# Patient Record
Sex: FEMALE | Race: WHITE | NOT HISPANIC OR LATINO | Employment: FULL TIME | URBAN - METROPOLITAN AREA
[De-identification: names, ages, dates, MRNs, and addresses within clinical notes are randomized per-mention and may not be internally consistent; named-entity substitution may affect disease eponyms.]

---

## 2023-12-14 ENCOUNTER — TELEPHONE (OUTPATIENT)
Dept: OBSTETRICS AND GYNECOLOGY | Facility: CLINIC | Age: 38
End: 2023-12-14
Payer: COMMERCIAL

## 2023-12-15 ENCOUNTER — OFFICE VISIT (OUTPATIENT)
Dept: OBSTETRICS AND GYNECOLOGY | Facility: CLINIC | Age: 38
End: 2023-12-15
Payer: COMMERCIAL

## 2023-12-15 VITALS
WEIGHT: 162 LBS | BODY MASS INDEX: 25.43 KG/M2 | HEIGHT: 67 IN | SYSTOLIC BLOOD PRESSURE: 100 MMHG | DIASTOLIC BLOOD PRESSURE: 60 MMHG

## 2023-12-15 DIAGNOSIS — N92.6 MISSED PERIOD: ICD-10-CM

## 2023-12-15 DIAGNOSIS — Z01.419 ROUTINE GYNECOLOGICAL EXAMINATION: Primary | ICD-10-CM

## 2023-12-15 DIAGNOSIS — Z91.89 AT RISK FOR FERTILITY PROBLEMS: ICD-10-CM

## 2023-12-15 LAB
B-HCG UR QL: NEGATIVE
EXPIRATION DATE: NORMAL
INTERNAL NEGATIVE CONTROL: NORMAL
INTERNAL POSITIVE CONTROL: NORMAL
Lab: NORMAL

## 2023-12-15 NOTE — PROGRESS NOTES
Gynecologic Annual Exam Note          GYN Annual Exam     Gynecologic Exam        Subjective     HPI  Juan J AMBROSIO is a 38 y.o. female, , who presents for annual well woman exam as a new patient . Patient's last menstrual period was 2023 (exact date)..  Patient reports her period is 8 days late. She has taken several HPT and all have been negative . Patient states she and her  are trying to conceive. She and her  have been to [facility] with Dr. Ghosh 3 years ago. They did labs and an ultrasound, as well as semen analysis for , and no issues were found. Her periods occur every 24-26 days, lasting 5 days. The flow is moderate.. She reports dysmenorrhea is severe, occurring first 1-2 days of flow. Marital Status: . She is sexually active. She has not had new partners.. STD testing recommendations have been explained to the patient and she does not desire STD testing. There were no changes to her medical or surgical history since her last visit..       She has never had a gynecologic exam outside of the fertility testing she had done (TVUS and HSG). She got  5 years ago, and has never had any other partners, so she didn't think it was needed.   Over the last several months they have not been trying as much, states they have sex every 3-4 days. Before this they were having sex every other day during the fertile period, she was using OPK's and getting positives.       Additional OB/GYN History   Current contraception: contraceptive methods: None  Desires to: do not start contraception    Last Pap : Never. Result: N/A. HPV:  N/A .   Last Completed Pap Smear       This patient has no relevant Health Maintenance data.          History of abnormal Pap smear: no  Family history of uterine, colon, breast, or ovarian cancer: no  Performs monthly Self-Breast Exam: yes  Last mammogram: Never.      Last Completed Mammogram       This patient has no relevant Health  "Maintenance data.            History of abnormal mammogram: no    Colonoscopy: has never had a colonoscopy.  Exercises Regularly: no  Feelings of Anxiety or Depression: no  Tobacco Usage?: No     No current outpatient medications on file.     Patient denies the need for medication refills today.    OB History          0    Para   0    Term   0       0    AB   0    Living   0         SAB   0    IAB   0    Ectopic   0    Molar   0    Multiple   0    Live Births   0                Past Medical History:   Diagnosis Date    Female infertility     Migraine     Varicella         Past Surgical History:   Procedure Laterality Date    APPENDECTOMY         Health Maintenance   Topic Date Due    Annual Gynecologic Pelvic and Breast Exam  Never done    BMI FOLLOWUP  Never done    TDAP/TD VACCINES (1 - Tdap) Never done    INFLUENZA VACCINE  2023    COVID-19 Vaccine (2 - -24 season) 2023    HEPATITIS C SCREENING  Never done    ANNUAL PHYSICAL  Never done    PAP SMEAR  Never done    Pneumococcal Vaccine 0-64  Aged Out       The additional following portions of the patient's history were reviewed and updated as appropriate: allergies, current medications, past family history, past medical history, past social history, past surgical history, and problem list.    Review of Systems   Constitutional: Negative.    Respiratory: Negative.     Cardiovascular: Negative.    Gastrointestinal: Negative.    Genitourinary:  Positive for menstrual problem.   Psychiatric/Behavioral: Negative.           I have reviewed and agree with the HPI, ROS, and historical information as entered above. Velvet Esteban, ANTWAN          Objective   /60   Ht 170.2 cm (67\")   Wt 73.5 kg (162 lb)   LMP 2023 (Exact Date)   BMI 25.37 kg/m²     Physical Exam  Vitals and nursing note reviewed. Exam conducted with a chaperone present.   Constitutional:       General: She is not in acute distress.     Appearance: Normal appearance. " She is well-developed and normal weight. She is not ill-appearing.   Neck:      Thyroid: No thyroid mass or thyromegaly.   Pulmonary:      Effort: Pulmonary effort is normal. No respiratory distress or retractions.   Chest:      Chest wall: No mass.   Breasts:     Right: Normal. No mass, nipple discharge, skin change or tenderness.      Left: Normal. No mass, nipple discharge, skin change or tenderness.      Comments: Fibrocystic tissue present bilaterally    Abdominal:      General: There is no distension.      Palpations: Abdomen is soft. Abdomen is not rigid. There is no mass.      Tenderness: There is no abdominal tenderness. There is no guarding or rebound.      Hernia: No hernia is present.   Genitourinary:     General: Normal vulva.      Exam position: Lithotomy position.      Labia:         Right: No rash, tenderness or lesion.         Left: No rash, tenderness or lesion.       Vagina: Normal. No vaginal discharge or lesions.      Cervix: Normal. No cervical motion tenderness, discharge, friability, erythema or cervical bleeding.      Uterus: Normal. Not enlarged, not fixed and not tender.       Adnexa: Right adnexa normal and left adnexa normal.        Right: No mass or tenderness.          Left: No mass or tenderness.        Rectum: Normal. No external hemorrhoid.   Musculoskeletal:      Cervical back: No muscular tenderness.   Skin:     General: Skin is warm and dry.   Neurological:      Mental Status: She is alert and oriented to person, place, and time.   Psychiatric:         Mood and Affect: Mood normal.         Behavior: Behavior normal.            Assessment and Plan    Problem List Items Addressed This Visit    None  Visit Diagnoses       Routine gynecological examination    -  Primary    Relevant Orders    LIQUID-BASED PAP SMEAR WITH HPV GENOTYPING REGARDLESS OF INTERPRETATION ([facility])    CBC (No Diff)    Comprehensive Metabolic Panel    Hemoglobin A1c    Hepatitis C Antibody    Lipid Panel     T4, Free    TSH    Missed period        Relevant Orders    POC Pregnancy, Urine (Completed)    US Non-ob Transvaginal    At risk for fertility problems        Relevant Orders    17-Hydroxyprogesterone    DHEA-Sulfate    Estradiol    Follicle Stimulating Hormone    Luteinizing Hormone    Testosterone            GYN annual well woman exam.   Pap guidelines reviewed. Pt has never had pap. Obtained today.   Preconceptual Counseling - Discussed cystic fibrosis screening, rubella screening, chicken pox immunity, folic acid supplementation and HIV screening.   Fibrocystic breast changes - Encouraged decreasing caffeine, supportive bra, low dose vitamin E supplementation.  Reviewed monthly self breast exams.  Instructed to call with lumps, pain, or breast discharge.    Ordered Mammogram today  Discussed importance of routine colon cancer screening. Reviewed current guidelines. Recommended colonoscopy after age 45.  Return in about 2 weeks (around 12/29/2023) for ultrasound and f/u with Carlito.     Velvet Esteban, ANTWAN  12/15/2023

## 2023-12-16 LAB
ALBUMIN SERPL-MCNC: 4.3 G/DL (ref 3.9–4.9)
ALBUMIN/GLOB SERPL: 2.3 {RATIO} (ref 1.2–2.2)
ALP SERPL-CCNC: 68 IU/L (ref 44–121)
ALT SERPL-CCNC: 13 IU/L (ref 0–32)
AST SERPL-CCNC: 17 IU/L (ref 0–40)
BILIRUB SERPL-MCNC: 0.7 MG/DL (ref 0–1.2)
BUN SERPL-MCNC: 17 MG/DL (ref 6–24)
BUN/CREAT SERPL: 24 (ref 9–23)
CALCIUM SERPL-MCNC: 8.8 MG/DL (ref 8.7–10.2)
CHLORIDE SERPL-SCNC: 103 MMOL/L (ref 96–106)
CHOLEST SERPL-MCNC: 196 MG/DL (ref 100–199)
CO2 SERPL-SCNC: 25 MMOL/L (ref 20–29)
CREAT SERPL-MCNC: 0.71 MG/DL (ref 0.57–1)
DHEA-S SERPL-MCNC: 79.2 UG/DL (ref 57.3–279.2)
EGFRCR SERPLBLD CKD-EPI 2021: 110 ML/MIN/1.73
ERYTHROCYTE [DISTWIDTH] IN BLOOD BY AUTOMATED COUNT: 11.9 % (ref 11.7–15.4)
ESTRADIOL SERPL-MCNC: 124 PG/ML
FSH SERPL-ACNC: 3.3 MIU/ML
GLOBULIN SER CALC-MCNC: 1.9 G/DL (ref 1.5–4.5)
GLUCOSE SERPL-MCNC: 60 MG/DL (ref 70–99)
HBA1C MFR BLD: 5.1 % (ref 4.8–5.6)
HCT VFR BLD AUTO: 42.6 % (ref 34–46.6)
HCV IGG SERPL QL IA: NON REACTIVE
HDLC SERPL-MCNC: 77 MG/DL
HGB BLD-MCNC: 14.2 G/DL (ref 11.1–15.9)
LDLC SERPL CALC-MCNC: 97 MG/DL (ref 0–99)
LH SERPL-ACNC: 4.1 MIU/ML
MCH RBC QN AUTO: 30.1 PG (ref 26.6–33)
MCHC RBC AUTO-ENTMCNC: 33.3 G/DL (ref 31.5–35.7)
MCV RBC AUTO: 90 FL (ref 79–97)
PLATELET # BLD AUTO: 162 X10E3/UL (ref 150–450)
POTASSIUM SERPL-SCNC: 3.8 MMOL/L (ref 3.5–5.2)
PROT SERPL-MCNC: 6.2 G/DL (ref 6–8.5)
RBC # BLD AUTO: 4.72 X10E6/UL (ref 3.77–5.28)
SODIUM SERPL-SCNC: 140 MMOL/L (ref 134–144)
T4 FREE SERPL-MCNC: 1.14 NG/DL (ref 0.82–1.77)
TESTOST SERPL-MCNC: <3 NG/DL (ref 8–60)
TRIGL SERPL-MCNC: 126 MG/DL (ref 0–149)
TSH SERPL DL<=0.005 MIU/L-ACNC: 1.48 UIU/ML (ref 0.45–4.5)
VLDLC SERPL CALC-MCNC: 22 MG/DL (ref 5–40)
WBC # BLD AUTO: 7.4 X10E3/UL (ref 3.4–10.8)

## 2023-12-21 LAB
17OHP SERPL-MCNC: 16 NG/DL
REF LAB TEST METHOD: NORMAL